# Patient Record
Sex: FEMALE | URBAN - METROPOLITAN AREA
[De-identification: names, ages, dates, MRNs, and addresses within clinical notes are randomized per-mention and may not be internally consistent; named-entity substitution may affect disease eponyms.]

---

## 2024-11-06 ENCOUNTER — TELEPHONE ENCOUNTER (OUTPATIENT)
Dept: URBAN - METROPOLITAN AREA CLINIC 17 | Facility: CLINIC | Age: 69
End: 2024-11-06

## 2024-12-16 ENCOUNTER — OFFICE VISIT (OUTPATIENT)
Dept: URBAN - METROPOLITAN AREA CLINIC 17 | Facility: CLINIC | Age: 69
End: 2024-12-16
Payer: MEDICARE

## 2024-12-16 ENCOUNTER — OFFICE VISIT (OUTPATIENT)
Dept: URBAN - METROPOLITAN AREA CLINIC 17 | Facility: CLINIC | Age: 69
End: 2024-12-16

## 2024-12-16 ENCOUNTER — DASHBOARD ENCOUNTERS (OUTPATIENT)
Age: 69
End: 2024-12-16

## 2024-12-16 VITALS
BODY MASS INDEX: 21.01 KG/M2 | SYSTOLIC BLOOD PRESSURE: 123 MMHG | HEART RATE: 102 BPM | WEIGHT: 107 LBS | HEIGHT: 60 IN | TEMPERATURE: 98 F | DIASTOLIC BLOOD PRESSURE: 75 MMHG

## 2024-12-16 DIAGNOSIS — D61.818 PANCYTOPENIA: ICD-10-CM

## 2024-12-16 DIAGNOSIS — K43.9 VENTRAL HERNIA: ICD-10-CM

## 2024-12-16 DIAGNOSIS — B18.2 HEP C W/O COMA, CHRONIC: ICD-10-CM

## 2024-12-16 DIAGNOSIS — I85.00 ESOPHAGEAL VARICES: ICD-10-CM

## 2024-12-16 PROCEDURE — 99243 OFF/OP CNSLTJ NEW/EST LOW 30: CPT

## 2024-12-16 PROCEDURE — 99203 OFFICE O/P NEW LOW 30 MIN: CPT

## 2024-12-16 RX ORDER — PANTOPRAZOLE SODIUM 40 MG/1
TABLET, DELAYED RELEASE ORAL
Qty: 90 TABLET | Status: ACTIVE | COMMUNITY

## 2024-12-16 RX ORDER — TRAZODONE HYDROCHLORIDE 50 MG/1
TAKE 1 TABLET BY MOUTH AT BEDTIME TABLET ORAL
Qty: 30 EACH | Refills: 0 | Status: ACTIVE | COMMUNITY

## 2024-12-16 RX ORDER — LACTULOSE 10 G/15ML
SOLUTION ORAL
Qty: 2700 MILLILITER | Status: ACTIVE | COMMUNITY

## 2024-12-16 RX ORDER — DULOXETINE HYDROCHLORIDE 60 MG/1
CAPSULE, DELAYED RELEASE PELLETS ORAL
Qty: 90 CAPSULE | Status: ACTIVE | COMMUNITY

## 2024-12-16 RX ORDER — TRAZODONE HYDROCHLORIDE 50 MG/1
TABLET ORAL
Qty: 90 TABLET | Status: ACTIVE | COMMUNITY

## 2024-12-16 RX ORDER — ARIPIPRAZOLE 5 MG/1
TABLET ORAL
Qty: 90 TABLET | Status: ACTIVE | COMMUNITY

## 2024-12-16 RX ORDER — RIFAXIMIN 550 MG/1
TABLET ORAL
Qty: 60 TABLET | Status: ACTIVE | COMMUNITY

## 2024-12-16 RX ORDER — GABAPENTIN 300 MG/1
CAPSULE ORAL
Qty: 270 CAPSULE | Status: ACTIVE | COMMUNITY

## 2024-12-16 RX ORDER — DULOXETINE 60 MG/1
TAKE 1 CAPSULE BY MOUTH IN THE MORNING CAPSULE, DELAYED RELEASE ORAL
Qty: 30 EACH | Refills: 0 | Status: ACTIVE | COMMUNITY

## 2024-12-16 NOTE — PHYSICAL EXAM CONSTITUTIONAL:
well developed, well nourished , in no acute distress , wheel-chair bound , normal communication ability

## 2024-12-16 NOTE — HPI-TODAY'S VISIT:
New patient 69 year old female with PMH of Hep C (treated 12 years ago HCV RNA < 15 undectable), alcohol abuse, pancytopenia, and anemia referred by Han Delgadillo for anemia.  Patient went to [facility] 11/19/2024 for weakness. Upon arrival her hgb was a 7. Per the ED note, she was deemed to have stable pancytopenia and anemia and was advised to follow up with her PCP. Her baseline hg is ~7. Her PCP note mentions a prior EGD 08/2024 that notes bleeding esophageal varices s/p banding and brar's esophagus. This report was not appreciated in EPIC system.  Today, she notes that she does not have bloody or black stools. "a long time ago" I did.  Denies nausea and vomiting.  She does not drink alcohol.             She mentions a large hernia, I advised her that her PCP referred her to a general surgeon already. She will contact [facility]   Labs:  CBC 11/19/24 WBC 1.8 RBC 3.57 Hgb 7.5 Hct 26.9 ncv 75.4 mch 21.0 mchc 27.9 pt 56 rdw 18.0  CMP 11/19/24 WNL

## 2024-12-17 LAB
ABSOLUTE BASOPHILS: 42
ABSOLUTE EOSINOPHILS: 169
ABSOLUTE LYMPHOCYTES: 606
ABSOLUTE MONOCYTES: 616
ABSOLUTE NEUTROPHILS: 3267
BASOPHILS: 0.9
EOSINOPHILS: 3.6
HEMATOCRIT: 37.5
HEMOGLOBIN: 11.7
LYMPHOCYTES: 12.9
MCH: 25
MCHC: 31.2
MCV: 80.1
MONOCYTES: 13.1
NEUTROPHILS: 69.5
PLATELET COUNT: 52
RDW: 23.2
RED BLOOD CELL COUNT: 4.68
WHITE BLOOD CELL COUNT: 4.7

## 2024-12-27 ENCOUNTER — OFFICE VISIT (OUTPATIENT)
Dept: URBAN - METROPOLITAN AREA CLINIC 17 | Facility: CLINIC | Age: 69
End: 2024-12-27

## 2025-01-06 ENCOUNTER — OFFICE VISIT (OUTPATIENT)
Dept: URBAN - METROPOLITAN AREA CLINIC 17 | Facility: CLINIC | Age: 70
End: 2025-01-06